# Patient Record
Sex: MALE | Race: BLACK OR AFRICAN AMERICAN | NOT HISPANIC OR LATINO | URBAN - METROPOLITAN AREA
[De-identification: names, ages, dates, MRNs, and addresses within clinical notes are randomized per-mention and may not be internally consistent; named-entity substitution may affect disease eponyms.]

---

## 2021-02-01 ENCOUNTER — OUTPATIENT (OUTPATIENT)
Dept: OUTPATIENT SERVICES | Facility: HOSPITAL | Age: 46
LOS: 1 days | End: 2021-02-01
Payer: MEDICAID

## 2021-02-11 ENCOUNTER — INPATIENT (INPATIENT)
Facility: HOSPITAL | Age: 46
LOS: 0 days | Discharge: HOME | End: 2021-02-12
Attending: HOSPITALIST | Admitting: HOSPITALIST
Payer: MEDICAID

## 2021-02-11 VITALS
HEART RATE: 60 BPM | DIASTOLIC BLOOD PRESSURE: 94 MMHG | OXYGEN SATURATION: 100 % | RESPIRATION RATE: 18 BRPM | TEMPERATURE: 97 F | SYSTOLIC BLOOD PRESSURE: 200 MMHG

## 2021-02-11 DIAGNOSIS — R51.9 HEADACHE, UNSPECIFIED: ICD-10-CM

## 2021-02-11 DIAGNOSIS — E11.9 TYPE 2 DIABETES MELLITUS WITHOUT COMPLICATIONS: ICD-10-CM

## 2021-02-11 DIAGNOSIS — N18.6 END STAGE RENAL DISEASE: ICD-10-CM

## 2021-02-11 DIAGNOSIS — I77.0 ARTERIOVENOUS FISTULA, ACQUIRED: Chronic | ICD-10-CM

## 2021-02-11 DIAGNOSIS — I16.1 HYPERTENSIVE EMERGENCY: ICD-10-CM

## 2021-02-11 DIAGNOSIS — I25.10 ATHEROSCLEROTIC HEART DISEASE OF NATIVE CORONARY ARTERY WITHOUT ANGINA PECTORIS: ICD-10-CM

## 2021-02-11 DIAGNOSIS — Z95.1 PRESENCE OF AORTOCORONARY BYPASS GRAFT: Chronic | ICD-10-CM

## 2021-02-11 DIAGNOSIS — Z29.9 ENCOUNTER FOR PROPHYLACTIC MEASURES, UNSPECIFIED: ICD-10-CM

## 2021-02-11 LAB
ALBUMIN SERPL ELPH-MCNC: 3.9 G/DL — SIGNIFICANT CHANGE UP (ref 3.3–5)
ALP SERPL-CCNC: 94 U/L — SIGNIFICANT CHANGE UP (ref 40–120)
ALT FLD-CCNC: 14 U/L — SIGNIFICANT CHANGE UP (ref 4–41)
ANION GAP SERPL CALC-SCNC: 13 MMOL/L — SIGNIFICANT CHANGE UP (ref 7–14)
APTT BLD: 29 SEC — SIGNIFICANT CHANGE UP (ref 27–36.3)
AST SERPL-CCNC: 14 U/L — SIGNIFICANT CHANGE UP (ref 4–40)
BASOPHILS # BLD AUTO: 0.06 K/UL — SIGNIFICANT CHANGE UP (ref 0–0.2)
BASOPHILS NFR BLD AUTO: 0.7 % — SIGNIFICANT CHANGE UP (ref 0–2)
BILIRUB SERPL-MCNC: 0.4 MG/DL — SIGNIFICANT CHANGE UP (ref 0.2–1.2)
BUN SERPL-MCNC: 22 MG/DL — SIGNIFICANT CHANGE UP (ref 7–23)
CALCIUM SERPL-MCNC: 8.5 MG/DL — SIGNIFICANT CHANGE UP (ref 8.4–10.5)
CHLORIDE SERPL-SCNC: 93 MMOL/L — LOW (ref 98–107)
CK MB BLD-MCNC: 3.5 % — HIGH (ref 0–2.5)
CK MB BLD-MCNC: 3.6 % — HIGH (ref 0–2.5)
CK MB CFR SERPL CALC: 4.2 NG/ML — SIGNIFICANT CHANGE UP
CK MB CFR SERPL CALC: 5 NG/ML — SIGNIFICANT CHANGE UP
CK SERPL-CCNC: 121 U/L — SIGNIFICANT CHANGE UP (ref 30–200)
CK SERPL-CCNC: 139 U/L — SIGNIFICANT CHANGE UP (ref 30–200)
CO2 SERPL-SCNC: 29 MMOL/L — SIGNIFICANT CHANGE UP (ref 22–31)
CREAT SERPL-MCNC: 3.14 MG/DL — HIGH (ref 0.5–1.3)
EOSINOPHIL # BLD AUTO: 0.7 K/UL — HIGH (ref 0–0.5)
EOSINOPHIL NFR BLD AUTO: 7.8 % — HIGH (ref 0–6)
GLUCOSE SERPL-MCNC: 128 MG/DL — HIGH (ref 70–99)
HCT VFR BLD CALC: 39.6 % — SIGNIFICANT CHANGE UP (ref 39–50)
HGB BLD-MCNC: 12.5 G/DL — LOW (ref 13–17)
IANC: 6.77 K/UL — SIGNIFICANT CHANGE UP (ref 1.5–8.5)
IMM GRANULOCYTES NFR BLD AUTO: 0.6 % — SIGNIFICANT CHANGE UP (ref 0–1.5)
INR BLD: 0.98 RATIO — SIGNIFICANT CHANGE UP (ref 0.88–1.16)
LYMPHOCYTES # BLD AUTO: 1.01 K/UL — SIGNIFICANT CHANGE UP (ref 1–3.3)
LYMPHOCYTES # BLD AUTO: 11.2 % — LOW (ref 13–44)
MAGNESIUM SERPL-MCNC: 1.9 MG/DL — SIGNIFICANT CHANGE UP (ref 1.6–2.6)
MCHC RBC-ENTMCNC: 28 PG — SIGNIFICANT CHANGE UP (ref 27–34)
MCHC RBC-ENTMCNC: 31.6 GM/DL — LOW (ref 32–36)
MCV RBC AUTO: 88.8 FL — SIGNIFICANT CHANGE UP (ref 80–100)
MONOCYTES # BLD AUTO: 0.42 K/UL — SIGNIFICANT CHANGE UP (ref 0–0.9)
MONOCYTES NFR BLD AUTO: 4.7 % — SIGNIFICANT CHANGE UP (ref 2–14)
NEUTROPHILS # BLD AUTO: 6.77 K/UL — SIGNIFICANT CHANGE UP (ref 1.8–7.4)
NEUTROPHILS NFR BLD AUTO: 75 % — SIGNIFICANT CHANGE UP (ref 43–77)
NRBC # BLD: 0 /100 WBCS — SIGNIFICANT CHANGE UP
NRBC # FLD: 0 K/UL — SIGNIFICANT CHANGE UP
PHOSPHATE SERPL-MCNC: 2.6 MG/DL — SIGNIFICANT CHANGE UP (ref 2.5–4.5)
PLATELET # BLD AUTO: 191 K/UL — SIGNIFICANT CHANGE UP (ref 150–400)
POTASSIUM SERPL-MCNC: 3.7 MMOL/L — SIGNIFICANT CHANGE UP (ref 3.5–5.3)
POTASSIUM SERPL-SCNC: 3.7 MMOL/L — SIGNIFICANT CHANGE UP (ref 3.5–5.3)
PROT SERPL-MCNC: 7.4 G/DL — SIGNIFICANT CHANGE UP (ref 6–8.3)
PROTHROM AB SERPL-ACNC: 11.3 SEC — SIGNIFICANT CHANGE UP (ref 10.6–13.6)
RBC # BLD: 4.46 M/UL — SIGNIFICANT CHANGE UP (ref 4.2–5.8)
RBC # FLD: 14.2 % — SIGNIFICANT CHANGE UP (ref 10.3–14.5)
SODIUM SERPL-SCNC: 135 MMOL/L — SIGNIFICANT CHANGE UP (ref 135–145)
TROPONIN T, HIGH SENSITIVITY RESULT: 205 NG/L — CRITICAL HIGH
TROPONIN T, HIGH SENSITIVITY RESULT: 227 NG/L — CRITICAL HIGH
WBC # BLD: 9.01 K/UL — SIGNIFICANT CHANGE UP (ref 3.8–10.5)
WBC # FLD AUTO: 9.01 K/UL — SIGNIFICANT CHANGE UP (ref 3.8–10.5)

## 2021-02-11 PROCEDURE — 99285 EMERGENCY DEPT VISIT HI MDM: CPT

## 2021-02-11 PROCEDURE — 70450 CT HEAD/BRAIN W/O DYE: CPT | Mod: 26

## 2021-02-11 PROCEDURE — 99223 1ST HOSP IP/OBS HIGH 75: CPT

## 2021-02-11 RX ORDER — CLOPIDOGREL BISULFATE 75 MG/1
75 TABLET, FILM COATED ORAL DAILY
Refills: 0 | Status: DISCONTINUED | OUTPATIENT
Start: 2021-02-12 | End: 2021-02-12

## 2021-02-11 RX ORDER — FUROSEMIDE 40 MG
1 TABLET ORAL
Qty: 0 | Refills: 0 | DISCHARGE

## 2021-02-11 RX ORDER — HYDRALAZINE HCL 50 MG
10 TABLET ORAL ONCE
Refills: 0 | Status: DISCONTINUED | OUTPATIENT
Start: 2021-02-11 | End: 2021-02-11

## 2021-02-11 RX ORDER — ISOSORBIDE MONONITRATE 60 MG/1
1 TABLET, EXTENDED RELEASE ORAL
Qty: 0 | Refills: 0 | DISCHARGE

## 2021-02-11 RX ORDER — SEVELAMER CARBONATE 2400 MG/1
800 POWDER, FOR SUSPENSION ORAL
Refills: 0 | Status: DISCONTINUED | OUTPATIENT
Start: 2021-02-11 | End: 2021-02-12

## 2021-02-11 RX ORDER — NIFEDIPINE 30 MG
1 TABLET, EXTENDED RELEASE 24 HR ORAL
Qty: 0 | Refills: 0 | DISCHARGE

## 2021-02-11 RX ORDER — METOPROLOL TARTRATE 50 MG
1 TABLET ORAL
Qty: 0 | Refills: 0 | DISCHARGE

## 2021-02-11 RX ORDER — SENNA PLUS 8.6 MG/1
2 TABLET ORAL AT BEDTIME
Refills: 0 | Status: DISCONTINUED | OUTPATIENT
Start: 2021-02-11 | End: 2021-02-12

## 2021-02-11 RX ORDER — FUROSEMIDE 40 MG
80 TABLET ORAL DAILY
Refills: 0 | Status: DISCONTINUED | OUTPATIENT
Start: 2021-02-12 | End: 2021-02-12

## 2021-02-11 RX ORDER — CLOPIDOGREL BISULFATE 75 MG/1
1 TABLET, FILM COATED ORAL
Qty: 0 | Refills: 0 | DISCHARGE

## 2021-02-11 RX ORDER — LISINOPRIL 2.5 MG/1
20 TABLET ORAL DAILY
Refills: 0 | Status: DISCONTINUED | OUTPATIENT
Start: 2021-02-12 | End: 2021-02-12

## 2021-02-11 RX ORDER — EVOLOCUMAB 140 MG/ML
1 INJECTION, SOLUTION SUBCUTANEOUS
Qty: 0 | Refills: 0 | DISCHARGE

## 2021-02-11 RX ORDER — NIFEDIPINE 30 MG
90 TABLET, EXTENDED RELEASE 24 HR ORAL ONCE
Refills: 0 | Status: COMPLETED | OUTPATIENT
Start: 2021-02-11 | End: 2021-02-11

## 2021-02-11 RX ORDER — HYDRALAZINE HCL 50 MG
5 TABLET ORAL ONCE
Refills: 0 | Status: COMPLETED | OUTPATIENT
Start: 2021-02-11 | End: 2021-02-11

## 2021-02-11 RX ORDER — ACETAMINOPHEN 500 MG
975 TABLET ORAL ONCE
Refills: 0 | Status: COMPLETED | OUTPATIENT
Start: 2021-02-11 | End: 2021-02-11

## 2021-02-11 RX ORDER — NIFEDIPINE 30 MG
90 TABLET, EXTENDED RELEASE 24 HR ORAL DAILY
Refills: 0 | Status: DISCONTINUED | OUTPATIENT
Start: 2021-02-12 | End: 2021-02-12

## 2021-02-11 RX ORDER — DOCUSATE SODIUM 100 MG
1 CAPSULE ORAL
Qty: 0 | Refills: 0 | DISCHARGE

## 2021-02-11 RX ORDER — HYDRALAZINE HCL 50 MG
1 TABLET ORAL
Qty: 0 | Refills: 0 | DISCHARGE

## 2021-02-11 RX ORDER — METOCLOPRAMIDE HCL 10 MG
10 TABLET ORAL ONCE
Refills: 0 | Status: COMPLETED | OUTPATIENT
Start: 2021-02-11 | End: 2021-02-11

## 2021-02-11 RX ORDER — HYDRALAZINE HCL 50 MG
100 TABLET ORAL EVERY 8 HOURS
Refills: 0 | Status: DISCONTINUED | OUTPATIENT
Start: 2021-02-11 | End: 2021-02-12

## 2021-02-11 RX ORDER — METOPROLOL TARTRATE 50 MG
50 TABLET ORAL DAILY
Refills: 0 | Status: DISCONTINUED | OUTPATIENT
Start: 2021-02-12 | End: 2021-02-12

## 2021-02-11 RX ORDER — ISOSORBIDE MONONITRATE 60 MG/1
60 TABLET, EXTENDED RELEASE ORAL DAILY
Refills: 0 | Status: DISCONTINUED | OUTPATIENT
Start: 2021-02-12 | End: 2021-02-12

## 2021-02-11 RX ORDER — POLYETHYLENE GLYCOL 3350 17 G/17G
17 POWDER, FOR SOLUTION ORAL DAILY
Refills: 0 | Status: DISCONTINUED | OUTPATIENT
Start: 2021-02-11 | End: 2021-02-12

## 2021-02-11 RX ADMIN — SENNA PLUS 2 TABLET(S): 8.6 TABLET ORAL at 21:52

## 2021-02-11 RX ADMIN — Medication 5 MILLIGRAM(S): at 11:56

## 2021-02-11 RX ADMIN — Medication 90 MILLIGRAM(S): at 12:57

## 2021-02-11 RX ADMIN — Medication 10 MILLIGRAM(S): at 11:11

## 2021-02-11 RX ADMIN — Medication 5 MILLIGRAM(S): at 11:11

## 2021-02-11 RX ADMIN — Medication 975 MILLIGRAM(S): at 12:57

## 2021-02-11 NOTE — H&P ADULT - HISTORY OF PRESENT ILLNESS
45 y/ M w/ hx DM2, HTN on dialysis (Tues/Ths/Sat), CAD, and CABG x2, poor historian, p/w headache x 2 months, worse today. Headache is diffuse, today he was getting his hemodialysis, it was 10/10, worst headache of his life, accompanied by dizziness, lightheadedness, nausea and vomiting x3 episodes (didn't notice the consistency or color.) Pt reports he finished his full HD today and was sent to [facility] for evaluation. He denies fever, chills, nuchal rigidity, chest pain, sob, palpitations, cough, hematochezia, melena or sick contacts.    In ED pt highest BP was 244/100, where he received Nifedipine and multiple doses of IV Hydralazine where his symptoms resolved. Headache on admission 1/10 w/ no nausea or vomiting. Pt claims he is fully compliant with meds and diet.

## 2021-02-11 NOTE — H&P ADULT - PROBLEM SELECTOR PLAN 2
Monitor electrolytes  Trend BUN/Cr  s/p full HD session today done Monitor electrolytes  Trend BUN/Cr  s/p full HD session today done  Consult renal in the am to resume HD

## 2021-02-11 NOTE — ED PROVIDER NOTE - OBJECTIVE STATEMENT
45 y/ M w/ hx DM, HTN on dialysis (Tues/Ths/Sat), does not recall his medical history/meds he is on, states he has had x2 months headache but worsening in the past few days, not improving with tylenol, sometimes with intermittent nausea/vomiting, but today had x3 vomiting after dialysis. Denies hematochezia/melena, abd pain, chest pain, shortness of breath. Normally systolic 's, in 's initially. patient states he was told to f/u with PCP but did not do so for the past few weeks. no fevers/chills, neck stiffness, visual changes, changes in strenght/sensatino, facial droop, denies changes in walking.     meds: (faxed from dialysis center) isosorbide, nifedipine 90mg, sevelamer, repatha, plavix, hydralazine, metoprolol, ramipril, zofran, fluticasone, clopidogrel

## 2021-02-11 NOTE — H&P ADULT - NSHPLABSRESULTS_GEN_ALL_CORE
CT HEad w/o con: normal  EKG: Sinus Bradycardia @ 59bpm peaked T-waves V3-V4, TWI I, AVL, .                        12.5   9.01  )-----------( 191      ( 11 Feb 2021 11:36 )             39.6     02-11    135  |  93<L>  |  22  ----------------------------<  128<H>  3.7   |  29  |  3.14<H>    Ca    8.5      11 Feb 2021 11:36  Phos  2.6     02-11  Mg     1.9     02-11    TPro  7.4  /  Alb  3.9  /  TBili  0.4  /  DBili  x   /  AST  14  /  ALT  14  /  AlkPhos  94  02-11        PT/INR - ( 11 Feb 2021 11:36 )   PT: 11.3 sec;   INR: 0.98 ratio         PTT - ( 11 Feb 2021 11:36 )  PTT:29.0 sec  LIVER FUNCTIONS - ( 11 Feb 2021 11:36 )  Alb: 3.9 g/dL / Pro: 7.4 g/dL / ALK PHOS: 94 U/L / ALT: 14 U/L / AST: 14 U/L / GGT: x

## 2021-02-11 NOTE — H&P ADULT - NSICDXPASTMEDICALHX_GEN_ALL_CORE_FT
PAST MEDICAL HISTORY:  CAD (coronary artery disease)     DM2 (diabetes mellitus, type 2)     ESRD on hemodialysis     HTN (hypertension)

## 2021-02-11 NOTE — H&P ADULT - PROBLEM SELECTOR PLAN 1
tele monitor   cardiac enzymes x 2 ordered for abnormal EKG  Serial EKGs  DASH 1800 ADA Renal diet  restart home bp meds Imdur, Nifedipine, Lasix, hydralazine and metoprolol. Gradually decrease blood pressure.  Please Keep SBP bettween 160-180 for next 24 hrs

## 2021-02-11 NOTE — ED PROVIDER NOTE - ATTENDING CONTRIBUTION TO CARE
44yo M with DM, htn, esrd on HD last dialyzed today, pw 2 months of headaches, almost daily assoc with nausea, vomiting, no vision changes or focal neuro xomplaints. vomited today at HD so was sent to ED. did get dialyzed today and took his meds. no fevers, chills, stiff neck  takes tylenol intermitently for headache  in ED pt hypertensive but normal neuro exam  will check labs. CT BP control

## 2021-02-11 NOTE — ED ADULT NURSE REASSESSMENT NOTE - NS ED NURSE REASSESS COMMENT FT1
PT received per day shift RN, PT AOx4, c/o "slight headache" at this time. PT still hypertensive at this time. PT denies blurred vision, numbness, tingling, chest pain. MD Lora made aware. awaiting med order. pt on cardiac monitor NSR. will continue to monitor.

## 2021-02-11 NOTE — ED ADULT NURSE NOTE - OBJECTIVE STATEMENT
Pt aa&ox4 PMH DM ESRD on Dialysis T, TH, S Left AVF presenting to ED from dialysis for HA and 3 episodes of vomiting while at dialysis today. Pt states he has had intermittent HA x2 months. Pt states HA is associated with nausea and vomiting. Pt placed on monitor NSR noted. Pt hypertensive in ED. Pt states "My blood pressure is always high" No neuro deficits reported or observed. 20g IV placed in RT AC by EMS. Will monitor Pt aa&ox4 PMH DM ESRD on Dialysis T, TH, S Left AVF presenting to ED from dialysis for HA and 3 episodes of vomiting while at dialysis today. Pt states he has had intermittent HA x2 months. Pt states HA is associated with nausea and vomiting. Pt placed on monitor NSR noted. Pt hypertensive in ED. Pt states "My blood pressure is always high" No neuro deficits reported or observed. 20g IV placed in RT AC, labs sent. Will monitor

## 2021-02-11 NOTE — ED PROVIDER NOTE - PROGRESS NOTE DETAILS
Geno, PGY-2: Pt reassessed multiple times in the past several hours. Patient headache much improved, still hypertensive 220-->200-->180 after 10 hydralazine and nifedipine. given poor follow-up, (patient doesn't know any of his physicians, hasn't seen them in a while), will admit for hypertensive urgency. nephro paged. pt did receive full session of dialysis today per outpatient dialysis center.

## 2021-02-11 NOTE — ED ADULT NURSE NOTE - CHIEF COMPLAINT QUOTE
pt c/o headache, nausea x2 months. Pt at dialysis today had a full 3 hour session, vomited 3x during and after dialysis. L AV fistula, schedule Tues/Thurs/Sat.

## 2021-02-11 NOTE — ED PROVIDER NOTE - PHYSICAL EXAMINATION
[Const] patient appears tired, fatigue, no acute distress  [HEENT] PERRL, EOMI, moist mucus membranes  [Neck] Supple, trachea midline  [CV] +S1/S2, no m/r/g appreciated  [Lungs] Clear to auscultations bilaterally, no adventitious lung sounds  [Abd] soft, non-tender, nondistended in all 4 quadrants  [MSK] 5/5 upper extremity and lower extremity str bilaterally  [Skin] warm, dry, well-perfused  [Neuro] A&Ox3, Cranial Nerves II-XII intact, gait intact

## 2021-02-11 NOTE — H&P ADULT - ASSESSMENT
45 y/ M w/ hx DM2, HTN on dialysis (Tues/Ths/Sat), CAD, and CABG x2, poor historian, p/w headache x 2 months, worse today, admitted to tele for HTN Emergency.

## 2021-02-11 NOTE — ED PROVIDER NOTE - CLINICAL SUMMARY MEDICAL DECISION MAKING FREE TEXT BOX
44 y/o M w/ hx dialysis, HTN, DM presents with x2 months headache, worsening today, nausea/vomiting after dialysis, sent in from dialysis center for evaluation. Obtain basics, cth, ekg, will reassess. r/o bleed subdural, also consider dialysis dysequilibrium vs common side effects of dialysis.

## 2021-02-12 ENCOUNTER — TRANSCRIPTION ENCOUNTER (OUTPATIENT)
Age: 46
End: 2021-02-12

## 2021-02-12 VITALS
OXYGEN SATURATION: 99 % | HEART RATE: 61 BPM | DIASTOLIC BLOOD PRESSURE: 65 MMHG | SYSTOLIC BLOOD PRESSURE: 142 MMHG | RESPIRATION RATE: 17 BRPM

## 2021-02-12 LAB
ANION GAP SERPL CALC-SCNC: 12 MMOL/L — SIGNIFICANT CHANGE UP (ref 7–14)
BASOPHILS # BLD AUTO: 0.09 K/UL — SIGNIFICANT CHANGE UP (ref 0–0.2)
BASOPHILS NFR BLD AUTO: 1.3 % — SIGNIFICANT CHANGE UP (ref 0–2)
BUN SERPL-MCNC: 46 MG/DL — HIGH (ref 7–23)
CALCIUM SERPL-MCNC: 8.5 MG/DL — SIGNIFICANT CHANGE UP (ref 8.4–10.5)
CHLORIDE SERPL-SCNC: 97 MMOL/L — LOW (ref 98–107)
CK MB BLD-MCNC: 3.5 % — HIGH (ref 0–2.5)
CK MB CFR SERPL CALC: 4 NG/ML — SIGNIFICANT CHANGE UP
CK SERPL-CCNC: 113 U/L — SIGNIFICANT CHANGE UP (ref 30–200)
CO2 SERPL-SCNC: 27 MMOL/L — SIGNIFICANT CHANGE UP (ref 22–31)
CREAT SERPL-MCNC: 5.43 MG/DL — HIGH (ref 0.5–1.3)
EOSINOPHIL # BLD AUTO: 0.59 K/UL — HIGH (ref 0–0.5)
EOSINOPHIL NFR BLD AUTO: 8.4 % — HIGH (ref 0–6)
GLUCOSE SERPL-MCNC: 158 MG/DL — HIGH (ref 70–99)
HCT VFR BLD CALC: 41.8 % — SIGNIFICANT CHANGE UP (ref 39–50)
HGB BLD-MCNC: 13.1 G/DL — SIGNIFICANT CHANGE UP (ref 13–17)
IANC: 4.47 K/UL — SIGNIFICANT CHANGE UP (ref 1.5–8.5)
IMM GRANULOCYTES NFR BLD AUTO: 0.3 % — SIGNIFICANT CHANGE UP (ref 0–1.5)
LYMPHOCYTES # BLD AUTO: 1.42 K/UL — SIGNIFICANT CHANGE UP (ref 1–3.3)
LYMPHOCYTES # BLD AUTO: 20.2 % — SIGNIFICANT CHANGE UP (ref 13–44)
MAGNESIUM SERPL-MCNC: 2.1 MG/DL — SIGNIFICANT CHANGE UP (ref 1.6–2.6)
MCHC RBC-ENTMCNC: 28.1 PG — SIGNIFICANT CHANGE UP (ref 27–34)
MCHC RBC-ENTMCNC: 31.3 GM/DL — LOW (ref 32–36)
MCV RBC AUTO: 89.5 FL — SIGNIFICANT CHANGE UP (ref 80–100)
MONOCYTES # BLD AUTO: 0.43 K/UL — SIGNIFICANT CHANGE UP (ref 0–0.9)
MONOCYTES NFR BLD AUTO: 6.1 % — SIGNIFICANT CHANGE UP (ref 2–14)
NEUTROPHILS # BLD AUTO: 4.47 K/UL — SIGNIFICANT CHANGE UP (ref 1.8–7.4)
NEUTROPHILS NFR BLD AUTO: 63.7 % — SIGNIFICANT CHANGE UP (ref 43–77)
NRBC # BLD: 0 /100 WBCS — SIGNIFICANT CHANGE UP
NRBC # FLD: 0 K/UL — SIGNIFICANT CHANGE UP
PHOSPHATE SERPL-MCNC: 4.9 MG/DL — HIGH (ref 2.5–4.5)
PLATELET # BLD AUTO: 214 K/UL — SIGNIFICANT CHANGE UP (ref 150–400)
POTASSIUM SERPL-MCNC: 4.2 MMOL/L — SIGNIFICANT CHANGE UP (ref 3.5–5.3)
POTASSIUM SERPL-SCNC: 4.2 MMOL/L — SIGNIFICANT CHANGE UP (ref 3.5–5.3)
RBC # BLD: 4.67 M/UL — SIGNIFICANT CHANGE UP (ref 4.2–5.8)
RBC # FLD: 14.4 % — SIGNIFICANT CHANGE UP (ref 10.3–14.5)
SARS-COV-2 RNA SPEC QL NAA+PROBE: SIGNIFICANT CHANGE UP
SODIUM SERPL-SCNC: 136 MMOL/L — SIGNIFICANT CHANGE UP (ref 135–145)
TROPONIN T, HIGH SENSITIVITY RESULT: 218 NG/L — CRITICAL HIGH
WBC # BLD: 7.02 K/UL — SIGNIFICANT CHANGE UP (ref 3.8–10.5)
WBC # FLD AUTO: 7.02 K/UL — SIGNIFICANT CHANGE UP (ref 3.8–10.5)

## 2021-02-12 PROCEDURE — 99239 HOSP IP/OBS DSCHRG MGMT >30: CPT

## 2021-02-12 RX ORDER — DOXERCALCIFEROL 2.5 UG/1
0.5 CAPSULE ORAL
Refills: 0 | Status: DISCONTINUED | OUTPATIENT
Start: 2021-02-12 | End: 2021-02-12

## 2021-02-12 RX ORDER — LISINOPRIL 2.5 MG/1
1 TABLET ORAL
Qty: 30 | Refills: 0
Start: 2021-02-12 | End: 2021-03-13

## 2021-02-12 RX ORDER — POLYETHYLENE GLYCOL 3350 17 G/17G
17 POWDER, FOR SOLUTION ORAL
Qty: 0 | Refills: 0 | DISCHARGE
Start: 2021-02-12

## 2021-02-12 RX ORDER — SEVELAMER CARBONATE 2400 MG/1
2 POWDER, FOR SUSPENSION ORAL
Qty: 0 | Refills: 0 | DISCHARGE
Start: 2021-02-12

## 2021-02-12 RX ORDER — SEVELAMER CARBONATE 2400 MG/1
1 POWDER, FOR SUSPENSION ORAL
Qty: 0 | Refills: 0 | DISCHARGE

## 2021-02-12 RX ORDER — SEVELAMER CARBONATE 2400 MG/1
1600 POWDER, FOR SUSPENSION ORAL
Refills: 0 | Status: DISCONTINUED | OUTPATIENT
Start: 2021-02-12 | End: 2021-02-12

## 2021-02-12 RX ORDER — CHLORHEXIDINE GLUCONATE 213 G/1000ML
1 SOLUTION TOPICAL DAILY
Refills: 0 | Status: DISCONTINUED | OUTPATIENT
Start: 2021-02-12 | End: 2021-02-12

## 2021-02-12 RX ORDER — DOXERCALCIFEROL 2.5 UG/1
0.25 CAPSULE ORAL
Qty: 0 | Refills: 0 | DISCHARGE
Start: 2021-02-12

## 2021-02-12 RX ORDER — SENNA PLUS 8.6 MG/1
2 TABLET ORAL
Qty: 0 | Refills: 0 | DISCHARGE
Start: 2021-02-12

## 2021-02-12 RX ORDER — RAMIPRIL 5 MG
1 CAPSULE ORAL
Qty: 0 | Refills: 0 | DISCHARGE

## 2021-02-12 RX ADMIN — CLOPIDOGREL BISULFATE 75 MILLIGRAM(S): 75 TABLET, FILM COATED ORAL at 09:15

## 2021-02-12 RX ADMIN — LISINOPRIL 20 MILLIGRAM(S): 2.5 TABLET ORAL at 05:45

## 2021-02-12 RX ADMIN — SEVELAMER CARBONATE 1600 MILLIGRAM(S): 2400 POWDER, FOR SUSPENSION ORAL at 17:01

## 2021-02-12 RX ADMIN — SEVELAMER CARBONATE 800 MILLIGRAM(S): 2400 POWDER, FOR SUSPENSION ORAL at 09:15

## 2021-02-12 RX ADMIN — ISOSORBIDE MONONITRATE 60 MILLIGRAM(S): 60 TABLET, EXTENDED RELEASE ORAL at 09:15

## 2021-02-12 RX ADMIN — Medication 50 MILLIGRAM(S): at 05:45

## 2021-02-12 RX ADMIN — Medication 80 MILLIGRAM(S): at 05:45

## 2021-02-12 RX ADMIN — Medication 100 MILLIGRAM(S): at 05:45

## 2021-02-12 RX ADMIN — POLYETHYLENE GLYCOL 3350 17 GRAM(S): 17 POWDER, FOR SOLUTION ORAL at 09:15

## 2021-02-12 RX ADMIN — Medication 100 MILLIGRAM(S): at 17:01

## 2021-02-12 RX ADMIN — Medication 90 MILLIGRAM(S): at 05:45

## 2021-02-12 NOTE — PROGRESS NOTE ADULT - PROBLEM SELECTOR PLAN 1
BPs now improved and symptoms now resolved. CE likely elevated in the setting of ESRD and demand- pt denies ever having any CP or SOB.   -will dc tele if pt remains stable   -Serial EKGs if pt with recurrent symptoms   -c/w Imdur, Nifedipine, Lasix, hydralazine and metoprolol.   -monitor BPs

## 2021-02-12 NOTE — DISCHARGE NOTE PROVIDER - NSDCMRMEDTOKEN_GEN_ALL_CORE_FT
docusate sodium 100 mg oral capsule: 1 cap(s) orally 3 times a day  doxercalciferol 2 mcg/mL intravenous solution: 0.25 milliliter(s) intravenous   given with HD   furosemide 80 mg oral tablet: 1 tab(s) orally once a day  hydrALAZINE 100 mg oral tablet: 1 tab(s) orally 3 times a day  isosorbide mononitrate 60 mg oral tablet, extended release: 1 tab(s) orally once a day (in the morning)  lisinopril 20 mg oral tablet: 1 tab(s) orally once a day  Metoprolol Succinate ER 50 mg oral tablet, extended release: 1 tab(s) orally once a day  NIFEdipine 90 mg oral tablet, extended release: 1 tab(s) orally once a day  Plavix 75 mg oral tablet: 1 tab(s) orally once a day  polyethylene glycol 3350 oral powder for reconstitution: 17 gram(s) orally once a day  Repatha 140 mg/mL subcutaneous solution: 1 dose(s) subcutaneous every 2 weeks  senna oral tablet: 2 tab(s) orally once a day (at bedtime)  sevelamer carbonate 800 mg oral tablet: 2 tab(s) orally 3 times a day (with meals)

## 2021-02-12 NOTE — DISCHARGE NOTE PROVIDER - CARE PROVIDER_API CALL
Roberto Jurado (DO)  Internal Medicine  [address] [id number]  Phone: [phone]  Fax: [phone]  Follow Up Time:

## 2021-02-12 NOTE — DISCHARGE NOTE PROVIDER - HOSPITAL COURSE
History of Present Illness:     45 y/ M w/ hx DM2, HTN on dialysis (Tues/Ths/Sat), CAD, and CABG x2, poor historian, p/w headache x 2 months, worse today. Headache is diffuse, today he was getting his hemodialysis, it was 10/10, worst headache of his life, accompanied by dizziness, lightheadedness, nausea and vomiting x3 episodes (didn't notice the consistency or color.) Pt reports he finished his full HD today and was sent to [facility] for evaluation. He denies fever, chills, nuchal rigidity, chest pain, sob, palpitations, cough, hematochezia, melena or sick contacts.    In ED pt highest BP was 244/100, where he received Nifedipine and multiple doses of IV Hydralazine where his symptoms resolved. Headache on admission 1/10 w/ no nausea or vomiting. Pt claims he is fully compliant with meds and diet.    Hospital Course:         History of Present Illness:     45 y/ M w/ hx DM2, HTN on dialysis (Tues/Ths/Sat), CAD, and CABG x2, poor historian, p/w headache x 2 months, worse today. Headache is diffuse, today he was getting his hemodialysis, it was 10/10, worst headache of his life, accompanied by dizziness, lightheadedness, nausea and vomiting x3 episodes (didn't notice the consistency or color.) Pt reports he finished his full HD today and was sent to [facility] for evaluation. He denies fever, chills, nuchal rigidity, chest pain, sob, palpitations, cough, hematochezia, melena or sick contacts.    In ED pt highest BP was 244/100, where he received Nifedipine and multiple doses of IV Hydralazine where his symptoms resolved. Headache on admission 1/10 w/ no nausea or vomiting. Pt claims he is fully compliant with meds and diet.    Hospital Course:     Problem: Hypertensive emergency without congestive heart failure.  Plan: BPs now improved and symptoms now resolved. CE likely elevated in the setting of ESRD and demand- pt denies ever having any CP or SOB.   -will dc tele if pt remains stable   -Serial EKGs if pt with recurrent symptoms   -c/w Imdur, Nifedipine, Lasix, hydralazine and metoprolol.        ESRD (end stage renal disease) on dialysis.  Plan: HD TTS  -Monitor BMP  -HD per renal. Pending official consult.   -Next HD tomorrow 2/13 w/ Dr. Adrien Flores      DM2 (diabetes mellitus, type 2).  Plan: -Monitor FS   -ISS for now  -f/u serum HgA1C. No prior results in sunrise.     CAD (coronary artery disease).  Plan: c/w plavix, metoprolol, imdur.     Dispo-  On 2/12/21 patient stble for discharge as per attending Dr/ Anabel.  Next HD treatement on 2/13.  All medications reviewed prior to discharge/      History of Present Illness:     45 y/ M w/ hx DM2, HTN on dialysis (Tues/Ths/Sat), CAD, and CABG x2, poor historian, p/w headache x 2 months, worse today. Headache is diffuse, today he was getting his hemodialysis, it was 10/10, worst headache of his life, accompanied by dizziness, lightheadedness, nausea and vomiting x3 episodes (didn't notice the consistency or color.) Pt reports he finished his full HD today and was sent to [facility] for evaluation. He denies fever, chills, nuchal rigidity, chest pain, sob, palpitations, cough, hematochezia, melena or sick contacts.     In ED pt highest BP was 244/100, where he received Nifedipine and multiple doses of IV Hydralazine where his symptoms resolved. Headache on admission 1/10 w/ no nausea or vomiting. Pt claims he is fully compliant with meds and diet.    Hospital Course:  Patient restarted on home medications Imdur, Nifedipine, Lasix, hydralazine and metoprolol with improvement in BPs. CE likely elevated in the setting of ESRD and demand  Pt was monitored on tele. Pt reported that he was only taking his hyrdalazine BID instead of TID which likely contributed. He was educated.       Pt stable on day of discharge. Case was d/w SW/CAROLANN who reinstated outpt HD w/ next session planned for 2/13.

## 2021-02-12 NOTE — PROGRESS NOTE ADULT - SUBJECTIVE AND OBJECTIVE BOX
Patient is a 45y old  Male who presents with a chief complaint of HTN Urgency (11 Feb 2021 16:56)      SUBJECTIVE / OVERNIGHT EVENTS:    Review of Systems:     MEDICATIONS  (STANDING):  clopidogrel Tablet 75 milliGRAM(s) Oral daily  furosemide    Tablet 80 milliGRAM(s) Oral daily  hydrALAZINE 100 milliGRAM(s) Oral every 8 hours  isosorbide   mononitrate ER Tablet (IMDUR) 60 milliGRAM(s) Oral daily  lisinopril 20 milliGRAM(s) Oral daily  metoprolol succinate ER 50 milliGRAM(s) Oral daily  NIFEdipine XL 90 milliGRAM(s) Oral daily  polyethylene glycol 3350 17 Gram(s) Oral daily  senna 2 Tablet(s) Oral at bedtime  sevelamer carbonate 800 milliGRAM(s) Oral three times a day with meals    MEDICATIONS  (PRN):      PHYSICAL EXAM:    ICU Vital Signs Last 24 Hrs  T(C): 36.7 (12 Feb 2021 09:05), Max: 37 (11 Feb 2021 17:51)  T(F): 98 (12 Feb 2021 09:05), Max: 98.6 (11 Feb 2021 17:51)  HR: 62 (12 Feb 2021 09:05) (60 - 72)  BP: 136/77 (12 Feb 2021 09:05) (122/65 - 244/100)  BP(mean): --  ABP: --  ABP(mean): --  RR: 16 (12 Feb 2021 09:05) (12 - 20)  SpO2: 99% (12 Feb 2021 09:05) (98% - 100%)      GENERAL: NAD, well-developed  HEAD:  Atraumatic, Normocephalic  EYES: EOMI, PERRLA, conjunctiva and sclera clear  NECK: Supple, No JVD  CHEST/LUNG: Clear to auscultation bilaterally; No wheeze  HEART: Regular rate and rhythm; No murmurs, rubs, or gallops  ABDOMEN: Soft, Nontender, Nondistended; Bowel sounds present  EXTREMITIES:  2+ Peripheral Pulses, No clubbing, cyanosis, or edema  PSYCH: AAOx3  NEUROLOGY: non-focal  SKIN: No rashes or lesions    LABS:  CAPILLARY BLOOD GLUCOSE                              13.1   7.02  )-----------( 214      ( 12 Feb 2021 09:14 )             41.8     02-12    136  |  97<L>  |  x   ----------------------------<  x   4.2   |  x   |  x     Ca    8.5      11 Feb 2021 11:36  Phos  2.6     02-11  Mg     2.1     02-12    TPro  7.4  /  Alb  3.9  /  TBili  0.4  /  DBili  x   /  AST  14  /  ALT  14  /  AlkPhos  94  02-11    PT/INR - ( 11 Feb 2021 11:36 )   PT: 11.3 sec;   INR: 0.98 ratio         PTT - ( 11 Feb 2021 11:36 )  PTT:29.0 sec  CARDIAC MARKERS ( 12 Feb 2021 01:10 )  x     / x     / 113 U/L / x     / 4.0 ng/mL  CARDIAC MARKERS ( 11 Feb 2021 19:48 )  x     / x     / 121 U/L / x     / 4.2 ng/mL  CARDIAC MARKERS ( 11 Feb 2021 11:38 )  x     / x     / 139 U/L / x     / 5.0 ng/mL          RADIOLOGY & ADDITIONAL TESTS:    Imaging Personally Reviewed:    Consultant(s) Notes Reviewed:      Care Discussed with Consultants/Other Providers: Patient is a 45y old  Male who presents with a chief complaint of HTN Urgency (11 Feb 2021 16:56)      SUBJECTIVE / OVERNIGHT EVENTS: Pt states that he feels back to his baseline. He no longer has a H/A or  n/v. He denies SOB, CP.     Review of Systems:     MEDICATIONS  (STANDING):  clopidogrel Tablet 75 milliGRAM(s) Oral daily  furosemide    Tablet 80 milliGRAM(s) Oral daily  hydrALAZINE 100 milliGRAM(s) Oral every 8 hours  isosorbide   mononitrate ER Tablet (IMDUR) 60 milliGRAM(s) Oral daily  lisinopril 20 milliGRAM(s) Oral daily  metoprolol succinate ER 50 milliGRAM(s) Oral daily  NIFEdipine XL 90 milliGRAM(s) Oral daily  polyethylene glycol 3350 17 Gram(s) Oral daily  senna 2 Tablet(s) Oral at bedtime  sevelamer carbonate 800 milliGRAM(s) Oral three times a day with meals    MEDICATIONS  (PRN):      PHYSICAL EXAM:    ICU Vital Signs Last 24 Hrs  T(C): 36.7 (12 Feb 2021 09:05), Max: 37 (11 Feb 2021 17:51)  T(F): 98 (12 Feb 2021 09:05), Max: 98.6 (11 Feb 2021 17:51)  HR: 62 (12 Feb 2021 09:05) (60 - 72)  BP: 136/77 (12 Feb 2021 09:05) (122/65 - 244/100)  BP(mean): --  ABP: --  ABP(mean): --  RR: 16 (12 Feb 2021 09:05) (12 - 20)  SpO2: 99% (12 Feb 2021 09:05) (98% - 100%)      GENERAL: NAD,   HEAD:  Atraumatic, Normocephalic  EYES: EOMI, conjunctiva and sclera clear  NECK: Supple,   CHEST/LUNG: Clear to auscultation bilaterally; No wheeze  HEART: Regular rate and rhythm; No murmurs, rubs, or gallops  ABDOMEN: Soft, Nontender, Nondistended; Bowel sounds present  EXTREMITIES:  2+ Peripheral Pulses, No clubbing, cyanosis, or edema  PSYCH: AAOx3  NEUROLOGY: non-focal  SKIN: No rashes or lesions    LABS:  CAPILLARY BLOOD GLUCOSE                              13.1   7.02  )-----------( 214      ( 12 Feb 2021 09:14 )             41.8     02-12    136  |  97<L>  |  x   ----------------------------<  x   4.2   |  x   |  x     Ca    8.5      11 Feb 2021 11:36  Phos  2.6     02-11  Mg     2.1     02-12    TPro  7.4  /  Alb  3.9  /  TBili  0.4  /  DBili  x   /  AST  14  /  ALT  14  /  AlkPhos  94  02-11    PT/INR - ( 11 Feb 2021 11:36 )   PT: 11.3 sec;   INR: 0.98 ratio         PTT - ( 11 Feb 2021 11:36 )  PTT:29.0 sec  CARDIAC MARKERS ( 12 Feb 2021 01:10 )  x     / x     / 113 U/L / x     / 4.0 ng/mL  CARDIAC MARKERS ( 11 Feb 2021 19:48 )  x     / x     / 121 U/L / x     / 4.2 ng/mL  CARDIAC MARKERS ( 11 Feb 2021 11:38 )  x     / x     / 139 U/L / x     / 5.0 ng/mL          RADIOLOGY & ADDITIONAL TESTS:    Imaging Personally Reviewed:    Consultant(s) Notes Reviewed:      Care Discussed with Consultants/Other Providers:

## 2021-02-12 NOTE — DISCHARGE NOTE NURSING/CASE MANAGEMENT/SOCIAL WORK - PATIENT PORTAL LINK FT
You can access the FollowMyHealth Patient Portal offered by [facility] by registering at the following website: [email or web address]. By joining [employer]’s FollowMyHealth portal, you will also be able to view your health information using other applications (apps) compatible with our system.

## 2021-02-12 NOTE — CONSULT NOTE ADULT - ASSESSMENT
45y Male with history of ESRD on HD presents with headaches. Nephrology consulted for ESRD status.    1) ESRD: Last HD on 2/11 as an outpatient. Plan for next maintenance HD on 2/13. Monitor electrolytes.    2) HTN with ESRD: BP controlled. Continue with current medications and low sodium diet. Monitor BP.    3) Anemia of renal disease: Hb acceptable. No need for AMEE. Monitor Hb.    4) Secondary HPT of renal origin: Resume hectorol 0.5 mcg with HD and increase renvela to 2 tabs with meals (home dose). Monitor serum calcium and phosphorus.    No renal objection to discharge.

## 2021-02-12 NOTE — DISCHARGE NOTE NURSING/CASE MANAGEMENT/SOCIAL WORK - NSDCCRNAME_GEN_ALL_CORE_FT
resume regular schedule with [facility] dialysis [facility] [phone] on Saturday, February 13th.

## 2021-02-12 NOTE — DISCHARGE NOTE PROVIDER - NSDCQMERRANDS_GEN_ALL_CORE
PLEASE CLICK THE EDIT BUTTON, ENTER YOUR RESPONSE TO THE QUERY AND SIGN THE NOTE.      Dr. Dewitt,    I am unable to locate a History and Physical for this patient in the patient's medical record.    To effectively reflect your patient's severity of illness and per J[employer] policy and Medical Executive guidelines, a History and Physical needs to be completed on all patients within 24 hours of admission or prior to any surgical or invasive procedures.      If there is an existing document that was completed prior to admission, it needs to be updated, signed, dated and timed.      If the document is greater than 30 days old, it needs to be redone per [employer] and hospital policy.     Please note that the Coders and I are only able to access information that is in the active Inpatient Chart. Please bring info from the Outpatient data into the current active chart. Providing the document allows the data to be utilized to reflect severity of illness and risk of mortality.                                                                             Thank-you for your time in assuring the accuracy of the medical record.    Linda Raines RN BSN [facility]  Pager [phone]  Clinical     Please respond here and remember to include in subsequent notes:      
No

## 2021-02-12 NOTE — PROGRESS NOTE ADULT - PROBLEM SELECTOR PLAN 5
DVT ppx- Improve Score low. SCDs for now if not ambulating    Dc planning once outpt HD can be reestablished

## 2021-02-12 NOTE — PHYSICAL THERAPY INITIAL EVALUATION ADULT - PATIENT PROFILE REVIEW, REHAB EVAL
ACTIVITY: Ambulate with Assistance; spoke with Heather Paige prior to PT Evaluation--> Pt OK for PT consult/OOB activity./yes

## 2021-02-12 NOTE — PHYSICAL THERAPY INITIAL EVALUATION ADULT - ADDITIONAL COMMENTS
Pt reports that he lives in a private house with his brother with 4 steps to enter; (+)bilateral handrails; bedroom is on the first floor. Prior to hospital admission pt was completely independent and used no assistive device with ambulation. Pt denies any recent falls.    Pt left comfortable seated @edge of bed, NAD, all lines intact, all precautions maintained, with call bell in reach, and RN aware of PT evaluation.

## 2021-02-12 NOTE — CONSULT NOTE ADULT - ATTENDING COMMENTS
[facility] NEPHROLOGY  Yon Florence M.D.  Roberto Jurado D.O.  Martha Dang M.D.  Yana Solorio, MSN, ANP-C    Telephone: [phone]  Facsimile: [phone]    [address] [place] [address]

## 2021-02-12 NOTE — DISCHARGE NOTE PROVIDER - NSDCCPCAREPLAN_GEN_ALL_CORE_FT
PRINCIPAL DISCHARGE DIAGNOSIS  Diagnosis: Headache  Assessment and Plan of Treatment: You had a cat scan on admission that was negative for infarct, bleed.    Likely secondary to elevated blood pressure.      SECONDARY DISCHARGE DIAGNOSES  Diagnosis: Hypertensive urgency  Assessment and Plan of Treatment: Your blood pressure has since normalized since admission.  Continue medications as prescribed and follow up with yournext HD session on 2/13.    Continue current blood pressure medication regimen as directed. Monitor for any visual changes, headaches or dizziness.  Monitor blood pressure regularly.  Follow up with your PCP for further management for high blood pressure, please call to make appointment within 1 week of discharge    Diagnosis: DM2 (diabetes mellitus, type 2)  Assessment and Plan of Treatment: Continue consistent carbohydrate diet.  Monitor blood glucose levels throughout the day before meals and at bedtime.  Record blood sugars and bring to outpatient providers appointment in order to be reviewed by your doctor for management modifications.  Be aware of diabetes management symptoms including feeling cool and clammy may be related to low glucose levels.  Feeling hot and dry may indicate high glucose levels.  If  you feel these symptoms, check your blood sugar.  Make regular podiatry appointments in order to have feet checked for wounds and toe nails cut by a doctor to prevent infections.       Diagnosis: ESRD (end stage renal disease) on dialysis  Assessment and Plan of Treatment: Please continue to follow your dialysis schedule and refer to your primary provider/nephrologist for further care and monitoring of kidney function and electrolytes. Continue a renal restricted diet (Avoiding foods high in potassium and phosphorus), your prescribed medications, and supplementations as directed.

## 2021-02-12 NOTE — CONSULT NOTE ADULT - SUBJECTIVE AND OBJECTIVE BOX
[facility] NEPHROLOGY- CONSULTATION NOTE    45y Male with history of below presents with headaches. Nephrology consulted for ESRD status.    Patient well known to our practice as follows with Dr. Dang as an outpatient for h/o ESRD on HD TTS at [facility]. Last HD on 2/11 as an outpatient completed in full with patient c/o headaches for which he was sent to [facility] for further evaluation.    REVIEW OF SYSTEMS:  Gen: no changes in weight, + H/A resolving  HEENT: no rhinorrhea  Neck: no sore throat  Cards: no chest pain  Resp: no dyspnea  GI: no nausea or vomiting or diarrhea  : no dysuria or hematuria  Vascular: no LE edema  Derm: no rashes  Neuro: no numbness/tingling    No Known Allergies      Home Medications Reviewed  Hospital Medications:   MEDICATIONS  (STANDING):  clopidogrel Tablet 75 milliGRAM(s) Oral daily  furosemide    Tablet 80 milliGRAM(s) Oral daily  hydrALAZINE 100 milliGRAM(s) Oral every 8 hours  isosorbide   mononitrate ER Tablet (IMDUR) 60 milliGRAM(s) Oral daily  lisinopril 20 milliGRAM(s) Oral daily  metoprolol succinate ER 50 milliGRAM(s) Oral daily  NIFEdipine XL 90 milliGRAM(s) Oral daily  polyethylene glycol 3350 17 Gram(s) Oral daily  senna 2 Tablet(s) Oral at bedtime  sevelamer carbonate 800 milliGRAM(s) Oral three times a day with meals      PAST MEDICAL & SURGICAL HISTORY:  ESRD on hemodialysis    HTN (hypertension)    DM2 (diabetes mellitus, type 2)    CAD (coronary artery disease)    S/P CABG x 2    AV (arteriovenous fistula)  LUE        FAMILY HISTORY:  N/C    SOCIAL HISTORY:  Denies toxic substance use     VITALS:  T(F): 98 (02-12-21 @ 09:05), Max: 98.6 (02-11-21 @ 17:51)  HR: 62 (02-12-21 @ 09:05)  BP: 136/77 (02-12-21 @ 09:05)  RR: 16 (02-12-21 @ 09:05)  SpO2: 99% (02-12-21 @ 09:05)  Wt(kg): --    Height (cm): 170.2 (02-11 @ 16:56)  Weight (kg): 72 (02-11 @ 16:56)  BMI (kg/m2): 24.9 (02-11 @ 16:56)  BSA (m2): 1.83 (02-11 @ 16:56)    PHYSICAL EXAM:  Gen: NAD, calm  HEENT: MMM  Neck: no JVD  Cards: RRR, +S1/S2, no M/G/R  Resp: CTA B/L  GI: soft, NT/ND, NABS  : no CVA tenderness  Vascular: no LE edema B/L, LUE AVG + bruit/thrill  Derm: no rashes  Neuro: non-focal    LABS:  02-12    136  |  97<L>  |  46<H>  ----------------------------<  158<H>  4.2   |  27  |  5.43<H>    Ca    8.5      12 Feb 2021 09:14  Phos  4.9     02-12  Mg     2.1     02-12    TPro  7.4  /  Alb  3.9  /  TBili  0.4  /  DBili      /  AST  14  /  ALT  14  /  AlkPhos  94  02-11    Creatinine Trend: 5.43 <--, 3.14 <--                        13.1   7.02  )-----------( 214      ( 12 Feb 2021 09:14 )             41.8     Urine Studies:        RADIOLOGY & ADDITIONAL STUDIES:    < from: CT Head No Cont (02.11.21 @ 11:52) >  IMPRESSION:  Normal non-contrast CT of the brain.    < end of copied text >

## 2021-02-17 DIAGNOSIS — Z71.89 OTHER SPECIFIED COUNSELING: ICD-10-CM

## 2021-10-25 NOTE — ED PROVIDER NOTE - ENMT NEGATIVE STATEMENT, MLM
Ears: no ear pain and no hearing problems. Nose: no nasal congestion and no nasal drainage. Mouth/Throat: no dysphagia, no hoarseness and no throat pain. Neck: no lumps, no pain, no stiffness and no swollen glands.
18-Oct-2021

## 2021-12-01 PROCEDURE — G9005: CPT

## 2023-06-17 ENCOUNTER — EMERGENCY (EMERGENCY)
Facility: HOSPITAL | Age: 48
LOS: 0 days | Discharge: HOME | End: 2023-06-17
Attending: EMERGENCY MEDICINE
Payer: MEDICAID

## 2023-06-17 VITALS
RESPIRATION RATE: 16 BRPM | HEART RATE: 59 BPM | DIASTOLIC BLOOD PRESSURE: 71 MMHG | OXYGEN SATURATION: 99 % | SYSTOLIC BLOOD PRESSURE: 153 MMHG | TEMPERATURE: 98 F

## 2023-06-17 VITALS
HEART RATE: 52 BPM | RESPIRATION RATE: 18 BRPM | TEMPERATURE: 98 F | SYSTOLIC BLOOD PRESSURE: 121 MMHG | OXYGEN SATURATION: 96 % | DIASTOLIC BLOOD PRESSURE: 58 MMHG | HEIGHT: 67 IN | WEIGHT: 75.4 LBS

## 2023-06-17 DIAGNOSIS — I25.10 ATHEROSCLEROTIC HEART DISEASE OF NATIVE CORONARY ARTERY WITHOUT ANGINA PECTORIS: ICD-10-CM

## 2023-06-17 DIAGNOSIS — I12.0 HYPERTENSIVE CHRONIC KIDNEY DISEASE WITH STAGE 5 CHRONIC KIDNEY DISEASE OR END STAGE RENAL DISEASE: ICD-10-CM

## 2023-06-17 DIAGNOSIS — Z95.1 PRESENCE OF AORTOCORONARY BYPASS GRAFT: Chronic | ICD-10-CM

## 2023-06-17 DIAGNOSIS — Z95.1 PRESENCE OF AORTOCORONARY BYPASS GRAFT: ICD-10-CM

## 2023-06-17 DIAGNOSIS — R42 DIZZINESS AND GIDDINESS: ICD-10-CM

## 2023-06-17 DIAGNOSIS — I95.9 HYPOTENSION, UNSPECIFIED: ICD-10-CM

## 2023-06-17 DIAGNOSIS — Z99.2 DEPENDENCE ON RENAL DIALYSIS: ICD-10-CM

## 2023-06-17 DIAGNOSIS — Z79.02 LONG TERM (CURRENT) USE OF ANTITHROMBOTICS/ANTIPLATELETS: ICD-10-CM

## 2023-06-17 DIAGNOSIS — N18.6 END STAGE RENAL DISEASE: ICD-10-CM

## 2023-06-17 DIAGNOSIS — R03.0 ELEVATED BLOOD-PRESSURE READING, WITHOUT DIAGNOSIS OF HYPERTENSION: ICD-10-CM

## 2023-06-17 DIAGNOSIS — E11.22 TYPE 2 DIABETES MELLITUS WITH DIABETIC CHRONIC KIDNEY DISEASE: ICD-10-CM

## 2023-06-17 DIAGNOSIS — I77.0 ARTERIOVENOUS FISTULA, ACQUIRED: Chronic | ICD-10-CM

## 2023-06-17 PROBLEM — E11.9 TYPE 2 DIABETES MELLITUS WITHOUT COMPLICATIONS: Chronic | Status: ACTIVE | Noted: 2021-02-11

## 2023-06-17 PROBLEM — I10 ESSENTIAL (PRIMARY) HYPERTENSION: Chronic | Status: ACTIVE | Noted: 2021-02-11

## 2023-06-17 LAB
ALBUMIN SERPL ELPH-MCNC: 3.7 G/DL — SIGNIFICANT CHANGE UP (ref 3.3–5)
ALP SERPL-CCNC: 102 U/L — SIGNIFICANT CHANGE UP (ref 40–120)
ALT FLD-CCNC: 24 U/L — SIGNIFICANT CHANGE UP (ref 12–78)
ANION GAP SERPL CALC-SCNC: 2 MMOL/L — LOW (ref 5–17)
AST SERPL-CCNC: 21 U/L — SIGNIFICANT CHANGE UP (ref 15–37)
BASOPHILS # BLD AUTO: 0.1 K/UL — SIGNIFICANT CHANGE UP (ref 0–0.2)
BASOPHILS NFR BLD AUTO: 1.7 % — SIGNIFICANT CHANGE UP (ref 0–2)
BILIRUB SERPL-MCNC: 0.5 MG/DL — SIGNIFICANT CHANGE UP (ref 0.2–1.2)
BUN SERPL-MCNC: 34 MG/DL — HIGH (ref 7–23)
CALCIUM SERPL-MCNC: 9.5 MG/DL — SIGNIFICANT CHANGE UP (ref 8.5–10.1)
CHLORIDE SERPL-SCNC: 99 MMOL/L — SIGNIFICANT CHANGE UP (ref 96–108)
CO2 SERPL-SCNC: 35 MMOL/L — HIGH (ref 22–31)
CREAT SERPL-MCNC: 5.91 MG/DL — HIGH (ref 0.5–1.3)
EGFR: 11 ML/MIN/1.73M2 — LOW
EOSINOPHIL # BLD AUTO: 0.72 K/UL — HIGH (ref 0–0.5)
EOSINOPHIL NFR BLD AUTO: 12.2 % — HIGH (ref 0–6)
GLUCOSE SERPL-MCNC: 161 MG/DL — HIGH (ref 70–99)
HCT VFR BLD CALC: 34 % — LOW (ref 39–50)
HGB BLD-MCNC: 10.9 G/DL — LOW (ref 13–17)
IMM GRANULOCYTES NFR BLD AUTO: 0.2 % — SIGNIFICANT CHANGE UP (ref 0–0.9)
LACTATE SERPL-SCNC: 2.2 MMOL/L — HIGH (ref 0.7–2)
LYMPHOCYTES # BLD AUTO: 1.34 K/UL — SIGNIFICANT CHANGE UP (ref 1–3.3)
LYMPHOCYTES # BLD AUTO: 22.7 % — SIGNIFICANT CHANGE UP (ref 13–44)
MCHC RBC-ENTMCNC: 32 PG — SIGNIFICANT CHANGE UP (ref 27–34)
MCHC RBC-ENTMCNC: 32.1 G/DL — SIGNIFICANT CHANGE UP (ref 32–36)
MCV RBC AUTO: 99.7 FL — SIGNIFICANT CHANGE UP (ref 80–100)
MONOCYTES # BLD AUTO: 0.52 K/UL — SIGNIFICANT CHANGE UP (ref 0–0.9)
MONOCYTES NFR BLD AUTO: 8.8 % — SIGNIFICANT CHANGE UP (ref 2–14)
NEUTROPHILS # BLD AUTO: 3.22 K/UL — SIGNIFICANT CHANGE UP (ref 1.8–7.4)
NEUTROPHILS NFR BLD AUTO: 54.4 % — SIGNIFICANT CHANGE UP (ref 43–77)
NRBC # BLD: 0 /100 WBCS — SIGNIFICANT CHANGE UP (ref 0–0)
PLATELET # BLD AUTO: 150 K/UL — SIGNIFICANT CHANGE UP (ref 150–400)
POTASSIUM SERPL-MCNC: 4.5 MMOL/L — SIGNIFICANT CHANGE UP (ref 3.5–5.3)
POTASSIUM SERPL-SCNC: 4.5 MMOL/L — SIGNIFICANT CHANGE UP (ref 3.5–5.3)
PROT SERPL-MCNC: 8.4 GM/DL — HIGH (ref 6–8.3)
RBC # BLD: 3.41 M/UL — LOW (ref 4.2–5.8)
RBC # FLD: 15.2 % — HIGH (ref 10.3–14.5)
SODIUM SERPL-SCNC: 136 MMOL/L — SIGNIFICANT CHANGE UP (ref 135–145)
WBC # BLD: 5.91 K/UL — SIGNIFICANT CHANGE UP (ref 3.8–10.5)
WBC # FLD AUTO: 5.91 K/UL — SIGNIFICANT CHANGE UP (ref 3.8–10.5)

## 2023-06-17 PROCEDURE — 99284 EMERGENCY DEPT VISIT MOD MDM: CPT

## 2023-06-17 RX ORDER — SODIUM CHLORIDE 9 MG/ML
1050 INJECTION INTRAMUSCULAR; INTRAVENOUS; SUBCUTANEOUS ONCE
Refills: 0 | Status: COMPLETED | OUTPATIENT
Start: 2023-06-17 | End: 2023-06-17

## 2023-06-17 RX ADMIN — SODIUM CHLORIDE 1050 MILLILITER(S): 9 INJECTION INTRAMUSCULAR; INTRAVENOUS; SUBCUTANEOUS at 11:27

## 2023-06-17 NOTE — ED PROVIDER NOTE - OBJECTIVE STATEMENT
47-year-old with history of renal failure on dialysis 3 times a week for 4 hours session.  This morning felt dizzy and reported to have low blood pressure and dialysis patient was discharged from dialysis and sent to the emergency department currently patient feels okay no symptoms of dizziness and blood pressure is okay.  Patient states he has been feeling dizzy and having low blood pressure all week.  No other symptoms no other complaints

## 2023-06-17 NOTE — ED PROVIDER NOTE - PROGRESS NOTE DETAILS
had two hours of dialysis  as per [facility]    recommend normal schedule

## 2023-06-17 NOTE — ED ADULT NURSE NOTE - OBJECTIVE STATEMENT
biba for bradycardiac and hypotension at dialysis office today, denies sob denies abd pain, pt c/o dizziness when lying or ambulating

## 2023-06-17 NOTE — ED ADULT TRIAGE NOTE - CHIEF COMPLAINT QUOTE
as per ems, pt sent from dialysis for hypotension and bradycardia x 1.5 hours into dialysis, no fluids removed during dialysis. pt denies dizziness, chest pain, sob. hx: cadiac stents, HTN, diabetes, ESRD via left arm fistula.

## 2023-06-17 NOTE — ED ADULT NURSE NOTE - NSFALLHARMRISKINTERV_ED_ALL_ED
Assistance OOB with selected safe patient handling equipment if applicable/Assistance with ambulation/Communicate risk of Fall with Harm to all staff, patient, and family/Encourage patient to sit up slowly, dangle for a short time, stand at bedside before walking/Monitor gait and stability/Orthostatic vital signs/Provide visual cue: red socks, yellow wristband, yellow gown, etc/Reinforce activity limits and safety measures with patient and family/Bed in lowest position, wheels locked, appropriate side rails in place/Call bell, personal items and telephone in reach/Instruct patient to call for assistance before getting out of bed/chair/stretcher/Non-slip footwear applied when patient is off stretcher/[place] to call system/Physically safe environment - no spills, clutter or unnecessary equipment/Purposeful Proactive Rounding/Room/bathroom lighting operational, light cord in reach

## 2023-06-17 NOTE — ED ADULT NURSE NOTE - CHIEF COMPLAINT QUOTE
as per ems, pt sent from dialysis for hypotension and bradycardia x 1.5 hours into dialysis, no fluids removed during dialysis. pt denies dizziness, chest pain, sob. hx: cardiac stents, HTN, diabetes, ESRD via left arm fistula.

## 2023-06-17 NOTE — ED ADULT TRIAGE NOTE - PAIN RATING/NUMBER SCALE (0-10): ACTIVITY
Pt is an 85 yo M with PMH T2D, HTN, HLD, and ESRD (LUE AVF, MWF, anuric) p/w F/C, fatigue, and confusion x1d. Baseline fully independent and active. Today was unable to recognize his children or identify that he was at home, which is very unusual for him. Missed HD today d/t symptoms. Had been complaining of fatigue and chills the day prior but did not check his temperature. Denies any known sick contacts, mostly stays home except for HD sessions; last session 1/23. COVID vaccinated and boosted. Otherwise had been in his usual state of health. Denies any recent changes to medications. Denies HA, CP, SOB, abd pain, changes to BMs, changes to appetite, pain in extremities. No other concerns or complaints. Updated daughter [place] via telephone, all questions answered to her satisfaction.    On arrival, T 100.1 -- 101.3, HR 84, /73, RR 16 O2sat 100% RA. EKG NSR without T wave changes. Labs with Hb 12.2, MCV 91, plt 145, K 6---5.6, BUN 15, Cr 14.31, phos 6.4. RVP + COVID, BCx in lab. CXR neg acute process. Renal consulted for urgent HD, went for session from ER. Pt given tylenol, ca glu 1g, insulin 5/D50, lokelma 5, and NS 500cc.  0 (no pain/absence of nonverbal indicators of pain)

## 2023-06-17 NOTE — ED PROVIDER NOTE - PHYSICAL EXAMINATION
Jansen:  General: No distress.  Mentation at baseline.   HEENT: WNL  Chest/Lungs: CTAB, No wheeze, No retractions, No increased work of breathing, Normal rate  Heart: S1S2 RRR, No M/R/G, Pules equal Bilaterally in upper and lower extremities distally  Abd: soft, NT/ND, No guarding, No rebound.  No hernias, no palpable masses.  Extrem: FROM in all joints, no significant edema noted, No ulcers.  Cap refil < 2sec.  Skin: No rash noted, warm dry.  Neuro:  Grossly normal.  No difficulty ambulating. No focal deficits.  Psychiatric: No evidence of delusions. No SI/HI.

## 2023-06-17 NOTE — ED PROVIDER NOTE - PATIENT PORTAL LINK FT
You can access the FollowMyHealth Patient Portal offered by [facility] by registering at the following website: [email or web address]. By joining [facility] [facility]’s FollowMyHealth portal, you will also be able to view your health information using other applications (apps) compatible with our system.

## 2023-06-22 LAB
CULTURE RESULTS: SIGNIFICANT CHANGE UP
CULTURE RESULTS: SIGNIFICANT CHANGE UP
SPECIMEN SOURCE: SIGNIFICANT CHANGE UP
SPECIMEN SOURCE: SIGNIFICANT CHANGE UP

## 2024-10-28 NOTE — ED ADULT NURSE NOTE - NS ED NURSE RECORD ANOTHER HT AND WT
Interval History: Feels well. Pain has completley resolved. No complaints.    Review of Systems   Skin:  Positive for rash.   All other systems reviewed and are negative.    Objective:     Vital Signs (Most Recent):  Temp: 97.7 °F (36.5 °C) (10/28/24 0730)  Pulse: 96 (10/28/24 0730)  Resp: 20 (10/28/24 0730)  BP: 122/83 (10/28/24 0730)  SpO2: 96 % (10/28/24 0730) Vital Signs (24h Range):  Temp:  [97 °F (36.1 °C)-98.5 °F (36.9 °C)] 97.7 °F (36.5 °C)  Pulse:  [] 96  Resp:  [17-20] 20  SpO2:  [96 %-100 %] 96 %  BP: (120-140)/(74-83) 122/83     Weight: 104.3 kg (230 lb)  Body mass index is 34.97 kg/m².    Estimated Creatinine Clearance: 114.8 mL/min (based on SCr of 0.7 mg/dL).     Physical Exam  Vitals and nursing note reviewed.   Constitutional:       Appearance: Normal appearance.   HENT:      Head: Normocephalic and atraumatic.      Right Ear: External ear normal.      Left Ear: External ear normal.   Eyes:      Pupils: Pupils are equal, round, and reactive to light.   Neurological:      General: No focal deficit present.      Mental Status: She is alert.   Psychiatric:         Mood and Affect: Mood normal.         Thought Content: Thought content normal.         Judgment: Judgment normal.     Rash: improved     Significant Labs: Blood Culture:   Recent Labs   Lab 10/24/24  0525 10/24/24  0614   LABBLOO No Growth after 4 days. No Growth after 4 days.     CBC:   Recent Labs   Lab 10/27/24  0634   WBC 7.69   HGB 10.5*   HCT 32.9*        CMP:   Recent Labs   Lab 10/27/24  0634      K 4.9      CO2 25      BUN 8   CREATININE 0.7   CALCIUM 9.2   ANIONGAP 11     Pathology Results  (Last 10 years)                 01/12/22 1132  Liquid-Based Pap Smear, Screening Final result    Narrative:  Release to patient->Immediate               Significant Imaging: None   No